# Patient Record
Sex: MALE | URBAN - METROPOLITAN AREA
[De-identification: names, ages, dates, MRNs, and addresses within clinical notes are randomized per-mention and may not be internally consistent; named-entity substitution may affect disease eponyms.]

---

## 2024-03-25 ENCOUNTER — TELEPHONE (OUTPATIENT)
Dept: ENT CLINIC | Age: 39
End: 2024-03-25

## 2024-03-25 NOTE — TELEPHONE ENCOUNTER
Final attempt to reach out to pt to reschedule missed appt. Lvm. Electronically signed by Amanda Dela Cruz on 3/25/2024 at 9:34 AM